# Patient Record
Sex: MALE | Race: WHITE | NOT HISPANIC OR LATINO | URBAN - METROPOLITAN AREA
[De-identification: names, ages, dates, MRNs, and addresses within clinical notes are randomized per-mention and may not be internally consistent; named-entity substitution may affect disease eponyms.]

---

## 2019-04-02 ENCOUNTER — OUTPATIENT (OUTPATIENT)
Dept: OUTPATIENT SERVICES | Facility: HOSPITAL | Age: 54
LOS: 1 days | End: 2019-04-02
Payer: COMMERCIAL

## 2019-04-02 VITALS
SYSTOLIC BLOOD PRESSURE: 140 MMHG | RESPIRATION RATE: 18 BRPM | HEART RATE: 107 BPM | OXYGEN SATURATION: 100 % | TEMPERATURE: 98 F | DIASTOLIC BLOOD PRESSURE: 88 MMHG

## 2019-04-02 VITALS
HEART RATE: 96 BPM | SYSTOLIC BLOOD PRESSURE: 124 MMHG | OXYGEN SATURATION: 97 % | RESPIRATION RATE: 18 BRPM | DIASTOLIC BLOOD PRESSURE: 88 MMHG

## 2019-04-02 DIAGNOSIS — Z78.9 OTHER SPECIFIED HEALTH STATUS: Chronic | ICD-10-CM

## 2019-04-02 DIAGNOSIS — R94.39 ABNORMAL RESULT OF OTHER CARDIOVASCULAR FUNCTION STUDY: ICD-10-CM

## 2019-04-02 LAB
ALBUMIN SERPL ELPH-MCNC: 4.4 G/DL — SIGNIFICANT CHANGE UP (ref 3.3–5.2)
ALP SERPL-CCNC: 66 U/L — SIGNIFICANT CHANGE UP (ref 40–120)
ALT FLD-CCNC: 19 U/L — SIGNIFICANT CHANGE UP
ANION GAP SERPL CALC-SCNC: 14 MMOL/L — SIGNIFICANT CHANGE UP (ref 5–17)
APTT BLD: 34.5 SEC — SIGNIFICANT CHANGE UP (ref 27.5–36.3)
AST SERPL-CCNC: 20 U/L — SIGNIFICANT CHANGE UP
BILIRUB SERPL-MCNC: 0.5 MG/DL — SIGNIFICANT CHANGE UP (ref 0.4–2)
BUN SERPL-MCNC: 11 MG/DL — SIGNIFICANT CHANGE UP (ref 8–20)
CALCIUM SERPL-MCNC: 9.3 MG/DL — SIGNIFICANT CHANGE UP (ref 8.6–10.2)
CHLORIDE SERPL-SCNC: 96 MMOL/L — LOW (ref 98–107)
CO2 SERPL-SCNC: 24 MMOL/L — SIGNIFICANT CHANGE UP (ref 22–29)
CREAT SERPL-MCNC: 0.81 MG/DL — SIGNIFICANT CHANGE UP (ref 0.5–1.3)
GLUCOSE SERPL-MCNC: 130 MG/DL — HIGH (ref 70–115)
HCT VFR BLD CALC: 38.8 % — LOW (ref 42–52)
HGB BLD-MCNC: 12.9 G/DL — LOW (ref 14–18)
INR BLD: 1.66 RATIO — HIGH (ref 0.88–1.16)
MAGNESIUM SERPL-MCNC: 1.4 MG/DL — LOW (ref 1.6–2.6)
MCHC RBC-ENTMCNC: 31.7 PG — HIGH (ref 27–31)
MCHC RBC-ENTMCNC: 33.2 G/DL — SIGNIFICANT CHANGE UP (ref 32–36)
MCV RBC AUTO: 95.3 FL — HIGH (ref 80–94)
PLATELET # BLD AUTO: 225 K/UL — SIGNIFICANT CHANGE UP (ref 150–400)
POTASSIUM SERPL-MCNC: 4.7 MMOL/L — SIGNIFICANT CHANGE UP (ref 3.5–5.3)
POTASSIUM SERPL-SCNC: 4.7 MMOL/L — SIGNIFICANT CHANGE UP (ref 3.5–5.3)
PROT SERPL-MCNC: 7.6 G/DL — SIGNIFICANT CHANGE UP (ref 6.6–8.7)
PROTHROM AB SERPL-ACNC: 19.4 SEC — HIGH (ref 10–12.9)
RBC # BLD: 4.07 M/UL — LOW (ref 4.6–6.2)
RBC # FLD: 14.6 % — SIGNIFICANT CHANGE UP (ref 11–15.6)
SODIUM SERPL-SCNC: 134 MMOL/L — LOW (ref 135–145)
WBC # BLD: 8.4 K/UL — SIGNIFICANT CHANGE UP (ref 4.8–10.8)
WBC # FLD AUTO: 8.4 K/UL — SIGNIFICANT CHANGE UP (ref 4.8–10.8)

## 2019-04-02 PROCEDURE — 85730 THROMBOPLASTIN TIME PARTIAL: CPT

## 2019-04-02 PROCEDURE — 85027 COMPLETE CBC AUTOMATED: CPT

## 2019-04-02 PROCEDURE — 36415 COLL VENOUS BLD VENIPUNCTURE: CPT

## 2019-04-02 PROCEDURE — C1769: CPT

## 2019-04-02 PROCEDURE — 85610 PROTHROMBIN TIME: CPT

## 2019-04-02 PROCEDURE — 93005 ELECTROCARDIOGRAM TRACING: CPT

## 2019-04-02 PROCEDURE — C1887: CPT

## 2019-04-02 PROCEDURE — 83735 ASSAY OF MAGNESIUM: CPT

## 2019-04-02 PROCEDURE — 80053 COMPREHEN METABOLIC PANEL: CPT

## 2019-04-02 PROCEDURE — 99152 MOD SED SAME PHYS/QHP 5/>YRS: CPT

## 2019-04-02 PROCEDURE — 93458 L HRT ARTERY/VENTRICLE ANGIO: CPT

## 2019-04-02 PROCEDURE — C1760: CPT

## 2019-04-02 PROCEDURE — 93010 ELECTROCARDIOGRAM REPORT: CPT

## 2019-04-02 RX ORDER — WARFARIN SODIUM 2.5 MG/1
1 TABLET ORAL
Qty: 0 | Refills: 0 | COMMUNITY

## 2019-04-02 RX ORDER — LISINOPRIL 2.5 MG/1
1 TABLET ORAL
Qty: 0 | Refills: 0 | COMMUNITY

## 2019-04-02 RX ORDER — SODIUM CHLORIDE 9 MG/ML
1000 INJECTION INTRAMUSCULAR; INTRAVENOUS; SUBCUTANEOUS
Qty: 0 | Refills: 0 | Status: DISCONTINUED | OUTPATIENT
Start: 2019-04-02 | End: 2019-04-17

## 2019-04-02 RX ORDER — ROSUVASTATIN CALCIUM 5 MG/1
1 TABLET ORAL
Qty: 0 | Refills: 0 | COMMUNITY

## 2019-04-02 RX ORDER — METFORMIN HYDROCHLORIDE 850 MG/1
1 TABLET ORAL
Qty: 0 | Refills: 0 | COMMUNITY

## 2019-04-02 RX ORDER — MAGNESIUM SULFATE 500 MG/ML
2 VIAL (ML) INJECTION ONCE
Qty: 0 | Refills: 0 | Status: COMPLETED | OUTPATIENT
Start: 2019-04-02 | End: 2019-04-02

## 2019-04-02 RX ORDER — METOPROLOL TARTRATE 50 MG
1 TABLET ORAL
Qty: 0 | Refills: 0 | COMMUNITY

## 2019-04-02 RX ORDER — ASPIRIN/CALCIUM CARB/MAGNESIUM 324 MG
81 TABLET ORAL ONCE
Qty: 0 | Refills: 0 | Status: COMPLETED | OUTPATIENT
Start: 2019-04-02 | End: 2019-04-02

## 2019-04-02 RX ADMIN — Medication 81 MILLIGRAM(S): at 10:33

## 2019-04-02 RX ADMIN — Medication 50 GRAM(S): at 11:04

## 2019-04-02 RX ADMIN — SODIUM CHLORIDE 50 MILLILITER(S): 9 INJECTION INTRAMUSCULAR; INTRAVENOUS; SUBCUTANEOUS at 10:33

## 2019-04-02 NOTE — DISCHARGE NOTE PROVIDER - NSDCFUADDINST_GEN_ALL_CORE_FT
No heavy lifting, driving, sex, tub baths, swimming, or any activity that submerges the lower half of the body in water for 48 hours.  Limited walking and stairs for 48 hours.    Change the bandaid after 24 hours and every 24 hours after that.  Keep the puncture site dry and covered with a bandaid until a scab forms.    Observe the site frequently.  If bleeding or a large lump (the size of a golf ball or bigger) occurs lie flat, apply continuous direct pressure just above the puncture site for at least 10 minutes, and notify your physician immediately.  If the bleeding cannot be controlled, call 911 immediately for assistance.  Notify your physician of pain, swelling or any drainage.    Notify your physician immediately if coldness, numbness, discoloration or pain in your foot occurs.  REMOVE RIGHT GROIN DRESSING WITHIN 24 HOURS of discharge

## 2019-04-02 NOTE — DISCHARGE NOTE NURSING/CASE MANAGEMENT/SOCIAL WORK - NSDCDPATPORTLINK_GEN_ALL_CORE
You can access the [facility] Patient Portal, offered by [facility], by registering with the following website: [email or web address]

## 2019-04-02 NOTE — H&P PST ADULT - --DESCRIBE SURGICAL SITE
left knee/right anterior toes being treated by wound care at [facility] Dr. Gonzalez

## 2019-04-02 NOTE — H&P PST ADULT - HISTORY OF PRESENT ILLNESS
53 y/o white male to have [facility] to evaluate w/u for CHRISTY with April 2018 NST revealing small to moderate sied equivocaly abnormal inferior, fixed defect consistent with artifact, dyskinesis of proximal and mid septal segments and apex. Akinesis in proximal to distal inferior, septal, anterior, and anterolateral segments with hypokinesis in the proximal to distal lateral, inferior, and inferoseptal segments. with EF 37%  Atrial fibrillation on AC, BB 51 y/o white male to have [facility] to evaluate w/u for CHRISTY with April 2018 NST revealing small to moderate size equivocally abnormal inferior, fixed defect consistent with artifact, dyskinesis of proximal and mid septal segments and apex. Akinesis in proximal to distal inferior, septal, anterior, and anterolateral segments with hypokinesis in the proximal to distal lateral, inferior, and inferoseptal segments. with EF 37%  Atrial fibrillation on AC, BB  States compliance with AC and medications  Active wound care management of left knee/right foot frostbite management  GFR>60  ASA 2 Mallampati 2  BR 0.7%

## 2019-04-02 NOTE — DISCHARGE NOTE PROVIDER - HOSPITAL COURSE
s/p LHC RFA s/p LHC RFA w/angioseal closure    REPORT:    VENTRICLES: There were no left ventricular global or regional wall motion    abnormalities. Global left ventricular function was normal. EF estimated    was 55 %.    VALVES: MITRAL VALVE: The mitral valve exhibited mild regurgitation.    CORONARY VESSELS: The coronary circulation is right dominant.    LM:   --  Distal left main: There was a tubular 10 % stenosis.    LAD:   --  Proximal LAD: The vessel was heavily calcified. Angiography    showed mild atherosclerosis with no flow limiting lesions.    --  Mid LAD: There was a diffuse 30 % stenosis. The lesion was heavily    calcified.    --  D1: There was a tubular 10 % stenosis. The lesion was moderately    calcified.    CX:   --  Circumflex: Angiography showed minor luminal irregularities with    no flow limiting lesions.    RCA:   --  Proximal RCA: There was a diffuse 20 % stenosis. The lesion was    moderately calcified.    --  Mid RCA: There was a tubular 25 % stenosis. The lesion was moderately    calcified.    COMPLICATIONS: No complications occurred during the cath lab visit.    DIAGNOSTIC RECOMMENDATIONS: The patient should continue with the present    medications.    Patient may proceed with his planned shoulder surgery.        Tolerated procedure well w/o complications    Seen post procedure by Dr. Solares                    REVIEW OF SYSTEMS:    Denies SOB, CP, NV, HA, dizziness, palpitations, site pain        PHYSICAL EXAM: A&Ox3 NAD Skin warm and dry    NEURO: Speech intact +gag +swallow Tongue midline DUBON    NECK: No JVD, trachea midline. Eupneic    HEART: irreg/irreg AFib VR  no ventricular ectopy    PULMONARY:  CTA pietro    ABDOMEN: Soft nontender X4 +BS Vdg/eating    EXTREMITIES:  RFA site: No bleed, hematoma, pain, ecchymosis or swelling Rt DP/PT+

## 2019-04-02 NOTE — H&P PST ADULT - NSICDXPASTMEDICALHX_GEN_ALL_CORE_FT
PAST MEDICAL HISTORY:  Diabetes mellitus     H/O frostbite Right toes/left knee    HLD (hyperlipidemia)     HTN (hypertension)     On warfarin for atrial fibrillation     Social alcohol use PAST MEDICAL HISTORY:  Cardiac LV ejection fraction >40%     Diabetes mellitus     Former smoker     H/O frostbite Right toes/left knee    HLD (hyperlipidemia)     HTN (hypertension)     On warfarin for atrial fibrillation     Social alcohol use

## 2019-04-02 NOTE — H&P PST ADULT - NEGATIVE GENERAL SYMPTOMS
no chills/no weight gain/no polyphagia/no polydipsia/no malaise/no weight loss/no polyuria/no fever/no sweating/no anorexia

## 2019-04-02 NOTE — H&P PST ADULT - NEGATIVE NEUROLOGICAL SYMPTOMS
no hemiparesis/no facial palsy/no weakness/no paresthesias/no generalized seizures/no confusion/no headache/no tremors/no focal seizures/no syncope/no transient paralysis/no loss of consciousness/no vertigo/no loss of sensation

## 2019-04-02 NOTE — DISCHARGE NOTE PROVIDER - INSTRUCTIONS
Choose lean meats and poultry without skin and prepare them without added saturated and trans fat.  Eat fish at least twice a week. Recent research shows that eating oily fish containing omega-3 fatty acids (for example, salmon, trout and herring) may help lower your risk of death from coronary artery disease.  Select fat-free, 1 percent fat and low-fat dairy products.  Cut back on foods containing partially hydrogenated vegetable oils to reduce trans fat in your diet.   To lower cholesterol, reduce saturated fat to no more than 5 to 6 percent of total calories. For someone eating 2,000 calories a day, that’s about 13 grams of saturated fat.  Cut back on beverages and foods with added sugars.  Choose and prepare foods with little or no salt. To lower blood pressure, aim to eat no more than 2,400 milligrams of sodium per day. Reducing daily intake to 1,500 mg is desirable because it can lower blood pressure even further.  If you drink alcohol, drink in moderation. That means one drink per day if you’re a woman and two drinks  per day if you’re a man.  Follow the American Heart Association recommendations when you eat out, and keep an eye on your portion sizes.     REFRAIN FROM BEER/ALCOHOL   CONTINUE TO REFRAIN FROM SMOKING

## 2019-04-02 NOTE — H&P PST ADULT - NEUROLOGICAL DETAILS
responds to pain/alert and oriented x 3/responds to verbal commands/sensation intact/cranial nerves intact/deep reflexes intact

## 2019-04-02 NOTE — H&P PST ADULT - ENMT
Health Maintenance Summary     Topic Due On Due Status Completed On    Immunization - Pneumococcal  Completed Apr 28, 2016    Diabetes Eye Exam Jul 19, 2018 Not Due Jul 19, 2017    Glycohemoglobin A1C  (Diabetes Sugar)  Aug 13, 2018 Not Due Feb 13, 2018    Microalbumin  (Diabetes Urine Test)  Feb 13, 2019 Not Due Feb 13, 2018    GFR  (Kidney Function Test)  Feb 13, 2019 Not Due Feb 13, 2018    Diabetes Foot Exam  Oct 31, 2018 Not Due Oct 31, 2017    Medicare Wellness Visit Feb 21, 2019 Not Due Feb 21, 2018    IMMUNIZATION - DTaP/Tdap/Td Jun 26, 2024 Not Due Jun 26, 2014    Immunization-Influenza  Completed Oct 31, 2017    Depression Screening Feb 21, 2019 Not Due Feb 21, 2018          Patient is up to date, no discussion needed .      Unaddressed Risk Adjusted HCC Categories and Diagnoses  HCC 18 - Diabetes with Chronic Complications   Unaddressed Dx:Type 2 diabetes mellitus with other specified complication (CMS/HCC)   - Vascular Disease   Unaddressed Dx:Abdominal aortic aneurysm (AAA) without rupture (CMS/HCC)       details… detailed exam

## 2019-04-02 NOTE — DISCHARGE NOTE PROVIDER - REASON FOR ADMISSION
[facility] to evaluate for rapid AFib with EF 37% in order for patient to undergo right shoulder surgery

## 2019-04-02 NOTE — DISCHARGE NOTE PROVIDER - CARE PROVIDER_API CALL
Virgil Solares)  Cardiovascular Disease; Interventional Cardiology  [address] [place] [address]  Phone: [phone]  Fax: [phone]  Follow Up Time:

## 2019-04-02 NOTE — H&P PST ADULT - RS GEN PE MLT RESP DETAILS PC
airway patent/no rales/no wheezes/no rhonchi/breath sounds equal/good air movement/normal/respirations non-labored

## 2019-04-02 NOTE — DISCHARGE NOTE PROVIDER - NSDCACTIVITY_GEN_ALL_CORE
Walking - Indoors allowed/No heavy lifting/straining/Do not drive or operate machinery/Do not make important decisions/Showering allowed

## 2019-04-02 NOTE — DISCHARGE NOTE PROVIDER - NSDCFUADDAPPT_GEN_ALL_CORE_FT
Call and make appointment to see Dr. Solares 1-2 weeks Call and make appointment to see Dr. Solares 1-2 weeks  Patient is cleared by cardiology to have his shoulder surgery

## 2019-04-02 NOTE — H&P PST ADULT - NEGATIVE OPHTHALMOLOGIC SYMPTOMS
no photophobia/no blurred vision R/no lacrimation L/no lacrimation R/no diplopia/no blurred vision L

## 2019-05-03 NOTE — DISCHARGE NOTE PROVIDER - CARE PROVIDERS DIRECT ADDRESSES
Here with feeling \"out of it\" face is quivering, very paranoid and scared about drugs he took on Saturday, Has not been eating or drinking well and not sleeping wel since ,DirectAddress_Unknown